# Patient Record
Sex: MALE | Race: ASIAN | NOT HISPANIC OR LATINO | URBAN - METROPOLITAN AREA
[De-identification: names, ages, dates, MRNs, and addresses within clinical notes are randomized per-mention and may not be internally consistent; named-entity substitution may affect disease eponyms.]

---

## 2018-08-01 ENCOUNTER — EMERGENCY (EMERGENCY)
Facility: HOSPITAL | Age: 83
LOS: 1 days | Discharge: HOME | End: 2018-08-01
Attending: EMERGENCY MEDICINE | Admitting: EMERGENCY MEDICINE
Payer: MEDICARE

## 2018-08-01 VITALS
RESPIRATION RATE: 18 BRPM | HEART RATE: 99 BPM | OXYGEN SATURATION: 95 % | SYSTOLIC BLOOD PRESSURE: 155 MMHG | DIASTOLIC BLOOD PRESSURE: 77 MMHG | TEMPERATURE: 98 F

## 2018-08-01 VITALS — HEART RATE: 78 BPM

## 2018-08-01 LAB
ALBUMIN SERPL ELPH-MCNC: 3.7 G/DL — SIGNIFICANT CHANGE UP (ref 3.3–5)
ALP SERPL-CCNC: 42 U/L — SIGNIFICANT CHANGE UP (ref 40–120)
ALT FLD-CCNC: 49 U/L — HIGH (ref 4–41)
ANISOCYTOSIS BLD QL: SLIGHT — SIGNIFICANT CHANGE UP
APPEARANCE UR: CLEAR — SIGNIFICANT CHANGE UP
AST SERPL-CCNC: 40 U/L — SIGNIFICANT CHANGE UP (ref 4–40)
BASE EXCESS BLDV CALC-SCNC: 7.3 MMOL/L — SIGNIFICANT CHANGE UP
BASOPHILS # BLD AUTO: 0.01 K/UL — SIGNIFICANT CHANGE UP (ref 0–0.2)
BASOPHILS NFR BLD AUTO: 0.2 % — SIGNIFICANT CHANGE UP (ref 0–2)
BILIRUB SERPL-MCNC: 1.1 MG/DL — SIGNIFICANT CHANGE UP (ref 0.2–1.2)
BILIRUB UR-MCNC: NEGATIVE — SIGNIFICANT CHANGE UP
BLOOD GAS VENOUS - CREATININE: 0.47 MG/DL — LOW (ref 0.5–1.3)
BLOOD UR QL VISUAL: HIGH
BUN SERPL-MCNC: 16 MG/DL — SIGNIFICANT CHANGE UP (ref 7–23)
CALCIUM SERPL-MCNC: 9.4 MG/DL — SIGNIFICANT CHANGE UP (ref 8.4–10.5)
CHLORIDE BLDV-SCNC: 105 MMOL/L — SIGNIFICANT CHANGE UP (ref 96–108)
CHLORIDE SERPL-SCNC: 104 MMOL/L — SIGNIFICANT CHANGE UP (ref 98–107)
CO2 SERPL-SCNC: 29 MMOL/L — SIGNIFICANT CHANGE UP (ref 22–31)
COLOR SPEC: YELLOW — SIGNIFICANT CHANGE UP
CREAT SERPL-MCNC: 0.63 MG/DL — SIGNIFICANT CHANGE UP (ref 0.5–1.3)
DACRYOCYTES BLD QL SMEAR: SLIGHT — SIGNIFICANT CHANGE UP
EOSINOPHIL # BLD AUTO: 0.07 K/UL — SIGNIFICANT CHANGE UP (ref 0–0.5)
EOSINOPHIL NFR BLD AUTO: 1.2 % — SIGNIFICANT CHANGE UP (ref 0–6)
GAS PNL BLDV: 143 MMOL/L — SIGNIFICANT CHANGE UP (ref 136–146)
GLUCOSE BLDV-MCNC: 109 — HIGH (ref 70–99)
GLUCOSE SERPL-MCNC: 108 MG/DL — HIGH (ref 70–99)
GLUCOSE UR-MCNC: NEGATIVE — SIGNIFICANT CHANGE UP
HCO3 BLDV-SCNC: 29 MMOL/L — HIGH (ref 20–27)
HCT VFR BLD CALC: 37.7 % — LOW (ref 39–50)
HCT VFR BLDV CALC: 39.6 % — SIGNIFICANT CHANGE UP (ref 39–51)
HGB BLD-MCNC: 12 G/DL — LOW (ref 13–17)
HGB BLDV-MCNC: 12.9 G/DL — LOW (ref 13–17)
HYALINE CASTS # UR AUTO: SIGNIFICANT CHANGE UP (ref 0–?)
HYPOCHROMIA BLD QL: SLIGHT — SIGNIFICANT CHANGE UP
IMM GRANULOCYTES # BLD AUTO: 0.05 # — SIGNIFICANT CHANGE UP
IMM GRANULOCYTES NFR BLD AUTO: 0.9 % — SIGNIFICANT CHANGE UP (ref 0–1.5)
KETONES UR-MCNC: NEGATIVE — SIGNIFICANT CHANGE UP
LACTATE BLDV-MCNC: 1.6 MMOL/L — SIGNIFICANT CHANGE UP (ref 0.5–2)
LEUKOCYTE ESTERASE UR-ACNC: NEGATIVE — SIGNIFICANT CHANGE UP
LYMPHOCYTES # BLD AUTO: 0.7 K/UL — LOW (ref 1–3.3)
LYMPHOCYTES # BLD AUTO: 12.2 % — LOW (ref 13–44)
MCHC RBC-ENTMCNC: 30.7 PG — SIGNIFICANT CHANGE UP (ref 27–34)
MCHC RBC-ENTMCNC: 31.8 % — LOW (ref 32–36)
MCV RBC AUTO: 96.4 FL — SIGNIFICANT CHANGE UP (ref 80–100)
MICROCYTES BLD QL: SLIGHT — SIGNIFICANT CHANGE UP
MONOCYTES # BLD AUTO: 0.26 K/UL — SIGNIFICANT CHANGE UP (ref 0–0.9)
MONOCYTES NFR BLD AUTO: 4.5 % — SIGNIFICANT CHANGE UP (ref 2–14)
MTX SERPL-SCNC: 0.09 UMOL/L — SIGNIFICANT CHANGE UP
MUCOUS THREADS # UR AUTO: SIGNIFICANT CHANGE UP
NEUTROPHILS # BLD AUTO: 4.64 K/UL — SIGNIFICANT CHANGE UP (ref 1.8–7.4)
NEUTROPHILS NFR BLD AUTO: 81 % — HIGH (ref 43–77)
NITRITE UR-MCNC: NEGATIVE — SIGNIFICANT CHANGE UP
NRBC # FLD: 0 — SIGNIFICANT CHANGE UP
OVALOCYTES BLD QL SMEAR: SLIGHT — SIGNIFICANT CHANGE UP
PCO2 BLDV: 52 MMHG — HIGH (ref 41–51)
PH BLDV: 7.4 PH — SIGNIFICANT CHANGE UP (ref 7.32–7.43)
PH UR: 6.5 — SIGNIFICANT CHANGE UP (ref 4.6–8)
PLATELET # BLD AUTO: 105 K/UL — LOW (ref 150–400)
PLATELET COUNT - ESTIMATE: SIGNIFICANT CHANGE UP
PMV BLD: 10.7 FL — SIGNIFICANT CHANGE UP (ref 7–13)
PO2 BLDV: 20 MMHG — LOW (ref 35–40)
POIKILOCYTOSIS BLD QL AUTO: SLIGHT — SIGNIFICANT CHANGE UP
POLYCHROMASIA BLD QL SMEAR: SIGNIFICANT CHANGE UP
POTASSIUM BLDV-SCNC: 2.9 MMOL/L — CRITICAL LOW (ref 3.4–4.5)
POTASSIUM SERPL-MCNC: 3 MMOL/L — LOW (ref 3.5–5.3)
POTASSIUM SERPL-SCNC: 3 MMOL/L — LOW (ref 3.5–5.3)
PROT SERPL-MCNC: 5.3 G/DL — LOW (ref 6–8.3)
PROT UR-MCNC: 20 MG/DL — SIGNIFICANT CHANGE UP
RBC # BLD: 3.91 M/UL — LOW (ref 4.2–5.8)
RBC # FLD: 23.4 % — HIGH (ref 10.3–14.5)
RBC CASTS # UR COMP ASSIST: SIGNIFICANT CHANGE UP (ref 0–?)
SAO2 % BLDV: 25.7 % — LOW (ref 60–85)
SODIUM SERPL-SCNC: 145 MMOL/L — SIGNIFICANT CHANGE UP (ref 135–145)
SP GR SPEC: 1.01 — SIGNIFICANT CHANGE UP (ref 1–1.04)
SQUAMOUS # UR AUTO: SIGNIFICANT CHANGE UP
UROBILINOGEN FLD QL: NORMAL MG/DL — SIGNIFICANT CHANGE UP
WBC # BLD: 5.73 K/UL — SIGNIFICANT CHANGE UP (ref 3.8–10.5)
WBC # FLD AUTO: 5.73 K/UL — SIGNIFICANT CHANGE UP (ref 3.8–10.5)
WBC UR QL: SIGNIFICANT CHANGE UP (ref 0–?)

## 2018-08-01 PROCEDURE — 71045 X-RAY EXAM CHEST 1 VIEW: CPT | Mod: 26

## 2018-08-01 PROCEDURE — 99285 EMERGENCY DEPT VISIT HI MDM: CPT | Mod: GC

## 2018-08-01 PROCEDURE — 93971 EXTREMITY STUDY: CPT | Mod: 26

## 2018-08-01 RX ORDER — POTASSIUM CHLORIDE 20 MEQ
40 PACKET (EA) ORAL ONCE
Qty: 0 | Refills: 0 | Status: COMPLETED | OUTPATIENT
Start: 2018-08-01 | End: 2018-08-01

## 2018-08-01 RX ORDER — SODIUM CHLORIDE 9 MG/ML
500 INJECTION INTRAMUSCULAR; INTRAVENOUS; SUBCUTANEOUS ONCE
Qty: 0 | Refills: 0 | Status: COMPLETED | OUTPATIENT
Start: 2018-08-01 | End: 2018-08-01

## 2018-08-01 RX ORDER — IPRATROPIUM/ALBUTEROL SULFATE 18-103MCG
3 AEROSOL WITH ADAPTER (GRAM) INHALATION ONCE
Qty: 0 | Refills: 0 | Status: COMPLETED | OUTPATIENT
Start: 2018-08-01 | End: 2018-08-01

## 2018-08-01 RX ORDER — POTASSIUM CHLORIDE 20 MEQ
40 PACKET (EA) ORAL ONCE
Qty: 0 | Refills: 0 | Status: DISCONTINUED | OUTPATIENT
Start: 2018-08-01 | End: 2018-08-01

## 2018-08-01 RX ORDER — POTASSIUM CHLORIDE 20 MEQ
10 PACKET (EA) ORAL ONCE
Qty: 0 | Refills: 0 | Status: COMPLETED | OUTPATIENT
Start: 2018-08-01 | End: 2018-08-01

## 2018-08-01 RX ADMIN — Medication 40 MILLIEQUIVALENT(S): at 15:20

## 2018-08-01 RX ADMIN — Medication 100 MILLIEQUIVALENT(S): at 15:10

## 2018-08-01 RX ADMIN — Medication 3 MILLILITER(S): at 18:59

## 2018-08-01 NOTE — ED PROVIDER NOTE - NS ED ROS FT
GENERAL: No fever or chills, //             EYES: no change in vision, //             HEENT: no trouble swallowing or speaking, //             CARDIAC: no chest pain, //              PULMONARY: no cough or SOB, //                  SKIN: no rashes,  //            NEURO: no headache,  //             MSK: No joint pain otherwise as HPI or negative. ~Judy Vides M.D., Ph.D. -Resident

## 2018-08-01 NOTE — ED ADULT NURSE NOTE - OBJECTIVE STATEMENT
Pt is blind w/ hx of BPH received to rm 17 aaox4 ambualtes w/ assistance c/o urinary retention since last night with associated severe lower abdominal pain.  14 [place] cudet tip indwelling oden catheter administered as ordered draining clear yellow urine to drainage bag (650ml) Pt endorsed relief of lower abdominal pain and denied pain after catheter intervention.  Pt also p/w RUE swelling, Pt p/w NAD post interventions breatsh even unlabored skin warm dry intact Right arm appears swollen. 20 g IV access obtained at [facility] labs and meds as ordered will monitor.

## 2018-08-01 NOTE — ED ADULT NURSE REASSESSMENT NOTE - NS ED NURSE REASSESS COMMENT FT1
@ 3 hrs after Qiu catheter placement Pt has passed 650ml clear yellow urine and denies pain to abdomen.  Meds as ordered.  Pt escorted to Vascular US by Transporter.

## 2018-08-01 NOTE — ED PROVIDER NOTE - MEDICAL DECISION MAKING DETAILS
85 male history of BPH and temporal arteritis on multiple immune suppresants here for urinary retention and arm swelling. Retention likley 2/2 BPH and possibly triggeredf by acute cystitis, doubt hematuria as culprit. right arm swelling may be 2/2 clot given history of temporal arteritis, may also be cellulitis. Doubt trauma. Will do basic labs, oden, CXR, VBG, RUE duplex, dispo pending workup.

## 2018-08-01 NOTE — ED ADULT NURSE NOTE - NSIMPLEMENTINTERV_GEN_ALL_ED
Implemented All Fall Risk Interventions:  [place] to call system. Call bell, personal items and telephone within reach. Instruct patient to call for assistance. Room bathroom lighting operational. Non-slip footwear when patient is off stretcher. Physically safe environment: no spills, clutter or unnecessary equipment. Stretcher in lowest position, wheels locked, appropriate side rails in place. Provide visual cue, wrist band, yellow gown, etc. Monitor gait and stability. Monitor for mental status changes and reorient to person, place, and time. Review medications for side effects contributing to fall risk. Reinforce activity limits and safety measures with patient and family.

## 2018-08-01 NOTE — ED PROVIDER NOTE - OBJECTIVE STATEMENT
85 male history of BPH, giant cell arteritis (allergic to prednisone so on acthar, actemra, MTX, and leucovorin), polio with lower extremity weakness, HTN, COPD occasionally on home O2, osteoporsis here for abdominal pain, urinary retention, and right arm swelling. Urinary retention started last night. abdominal pain started this AM, constant, points to suprapubic area, severe, radiating to back, no associated nausea/vomiting. Had dysuria for a week before. With regard to right arm swelling it is painless and started 1 week ago. No history of clots.   No urologist  Primary:

## 2018-08-01 NOTE — ED PROVIDER NOTE - CARE PLAN
Principal Discharge DX:	Acute urinary retention Principal Discharge DX:	Acute urinary retention  Assessment and plan of treatment:	You were seen in the ER for urinary retention and arm swelling. You must follow up with your primary physician in 24 to 48 hours. Return to the ER for any new or worsening signs/symptoms. See handout for additional reasons to return to the ER.   1) You must follow up with a urologist in 24 to 48 hours. Continue to wear the leg bag and oden as described to you.   2) You must follow up with a vascular specialist this week.  Secondary Diagnosis:	Arm swelling

## 2018-08-01 NOTE — ED PROVIDER NOTE - PROGRESS NOTE DETAILS
- MD Mahogany,PhD - Resident: patient had oden placed right away, <1000 ccs out in first hour. Originally ordered radiology study of RUE but was told this was incorrect study and to order Vascular study. Vascular study performed, I called for an update and was told attending coming down to read study shortly. Study did not result so called radiology around 2030 who said that vascular does not reads studies overnight (when I spoke to vascular tech on the phone it was about 4 pm, not yet overnight). In discussion with patient and family patient is now feeling at baseline, acting and appears at baseline. They will follow up with urology and vascular. We will call them tomorrow with duplex result. - MD Mahogany,PhD - Resident: patient had oden placed right away, <1000 ccs out in first hour. Originally ordered radiology study of RUE but was told this was incorrect study and to order Vascular study. Vascular study performed, I called for an update and was told attending coming down to read study shortly. Study did not result so called radiology around 2030 who said that vascular does not reads studies overnight (when I spoke to vascular tech on the phone it was about 4 pm, not yet overnight). Patient appears at baseline but is tachycardic and tremulous. Will admit for low K and tachycardia. - MD Mahogany,PhD - Resident: patient had oden placed right away, <1000 ccs out in first hour. Originally ordered radiology study of RUE but was told this was incorrect study and to order Vascular study. Vascular study performed, I called for an update and was told attending coming down to read study shortly. Study did not result so called radiology around 2030 who said that vascular does not reads studies overnight (when I spoke to vascular tech on the phone it was about 4 pm, not yet overnight). Patient appears at baseline but is continuously tachycardic and tremulous. Will admit for low K and tachycardia. - MD Mahogany,PhD - Resident: patient had oden placed right away, <1000 ccs out in first hour. Originally ordered radiology study of RUE but was told this was incorrect study and to order Vascular study. Vascular study performed, I called for an update and was told attending coming down to read study shortly. Study did not result so called radiology around 2030 who said that vascular does not reads studies overnight (when I spoke to vascular tech on the phone it was about 4 pm, not yet overnight). Patient appears at baseline per son. Patient revitalled and found to have HR in 90s when comfortable and asleep. Will dc home. Family understands need for following up duplex as well as vascular and urology Follow up. will dc home.

## 2018-08-01 NOTE — ED PROVIDER NOTE - PLAN OF CARE
You were seen in the ER for urinary retention and arm swelling. You must follow up with your primary physician in 24 to 48 hours. Return to the ER for any new or worsening signs/symptoms. See handout for additional reasons to return to the ER.   1) You must follow up with a urologist in 24 to 48 hours. Continue to wear the leg bag and oden as described to you.   2) You must follow up with a vascular specialist this week.

## 2018-08-01 NOTE — ED PROVIDER NOTE - ATTENDING CONTRIBUTION TO CARE
MD Polk:  patient seen and evaluated with the resident.  I was present for key portions of the History & Physical, and I agree with the Impression & Plan.  MD Polk:  86 yo M, c/o inability to urinate ~ 12 hrs.  Quality:  throbbing abdominal pain.  Location:  suprapubic abd ttp.  Associated Sx: dysuria/burning x 1 wk.  Better:  oden in ED today; worse: none.  Intensity:  (pre-Oden) 10/10, (post-oden) 2/10.  VS: wnl. Physical Exam:  adult M, chronically ill-appearing, blind in both eyes (B temporal arteritis), neck supple, CTA B, RRR.  Abd (pre-oden): enlarged bladder, +ttp, no R, +guarding. Abd exam (post-oden) = benign.  Ext:  +muscle wasting (unchanged from baseline 2/2 polio).  Impression:  urinary retention > 10hrs, likely due to urinary tract infection.  Plan:  place oden cather and monitor output, creatinine, cbc, UA, UCx, reassess.

## 2018-08-01 NOTE — ED PROVIDER NOTE - PHYSICAL EXAMINATION
Gen:: uncomfortable in stretcher, sitting upright, endorsing pain //            Head: NCAT //            HEENT: bilateral eyes closed, oral mucosa moist, normal conjunctiva //            Lung: tachypnic, diminished lung sounds throughout without no wheezes/rhonchi/rales B/L, speaking in full sentences. //            CV: tachy, no murmurs, rubs or gallops //            Abd: mass palpable to above umbilicus, tender suprapubic, + guarding, no CVA tenderness //            MSK: atrophied bilateral Lower Extremity, 2+ edema in RUE, distal neuromuscular intact with warm extremity, 3cm red appearing contusion on right arm, pulses equal in b/l Upper Extremity.  //            Neuro: No focal sensory or motor deficits //            Skin:  see MSK  //            Psych: normal affect. ~Judy Vides M.D., Ph.D. -Resident

## 2018-08-02 LAB
BACTERIA UR CULT: SIGNIFICANT CHANGE UP
SPECIMEN SOURCE: SIGNIFICANT CHANGE UP

## 2018-08-02 NOTE — ED CLERICAL - CLERICAL COMMENTS
- MD Mahogany,PhD - Resident: I personally called Bernabe Enrique, the patient's son, regarding the vascular duplex of RUE. I told him the study was negative for clots. He understands and will Follow up with a vascular surgeon for the swelling.

## 2020-11-17 NOTE — ED ADULT TRIAGE NOTE - TEMPERATURE IN FAHRENHEIT (DEGREES F)
98.4
Implemented All Fall Risk Interventions:  [place] to call system. Call bell, personal items and telephone within reach. Instruct patient to call for assistance. Room bathroom lighting operational. Non-slip footwear when patient is off stretcher. Physically safe environment: no spills, clutter or unnecessary equipment. Stretcher in lowest position, wheels locked, appropriate side rails in place. Provide visual cue, wrist band, yellow gown, etc. Monitor gait and stability. Monitor for mental status changes and reorient to person, place, and time. Review medications for side effects contributing to fall risk. Reinforce activity limits and safety measures with patient and family.

## 2021-05-03 NOTE — ED ADULT NURSE NOTE - CAS EDN DISCHARGE ASSESSMENT
Alert and oriented to person, place and time Class III - visualization of the soft palate and the base of the uvula